# Patient Record
Sex: MALE | Race: WHITE
[De-identification: names, ages, dates, MRNs, and addresses within clinical notes are randomized per-mention and may not be internally consistent; named-entity substitution may affect disease eponyms.]

---

## 2021-04-13 DIAGNOSIS — Z23 ENCOUNTER FOR IMMUNIZATION: ICD-10-CM

## 2024-08-19 ENCOUNTER — HOSPITAL ENCOUNTER (INPATIENT)
Dept: HOSPITAL 99 - 4 WEST ACU | Age: 59
LOS: 8 days | Discharge: HOME HEALTH | DRG: 501 | End: 2024-08-27
Payer: COMMERCIAL

## 2024-08-19 VITALS — RESPIRATION RATE: 18 BRPM | DIASTOLIC BLOOD PRESSURE: 73 MMHG | SYSTOLIC BLOOD PRESSURE: 155 MMHG

## 2024-08-19 VITALS — SYSTOLIC BLOOD PRESSURE: 139 MMHG | DIASTOLIC BLOOD PRESSURE: 96 MMHG | RESPIRATION RATE: 18 BRPM

## 2024-08-19 VITALS — DIASTOLIC BLOOD PRESSURE: 71 MMHG | SYSTOLIC BLOOD PRESSURE: 113 MMHG | RESPIRATION RATE: 18 BRPM

## 2024-08-19 VITALS — BODY MASS INDEX: 41.7 KG/M2

## 2024-08-19 VITALS — SYSTOLIC BLOOD PRESSURE: 166 MMHG | RESPIRATION RATE: 18 BRPM | DIASTOLIC BLOOD PRESSURE: 94 MMHG

## 2024-08-19 DIAGNOSIS — I10: ICD-10-CM

## 2024-08-19 DIAGNOSIS — M86.9: ICD-10-CM

## 2024-08-19 DIAGNOSIS — L97.419: ICD-10-CM

## 2024-08-19 DIAGNOSIS — Z79.84: ICD-10-CM

## 2024-08-19 DIAGNOSIS — D62: ICD-10-CM

## 2024-08-19 DIAGNOSIS — T84.84XA: ICD-10-CM
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
## 2024-08-19 DIAGNOSIS — E11.69: ICD-10-CM

## 2024-08-19 DIAGNOSIS — Z86.61: ICD-10-CM

## 2024-08-19 DIAGNOSIS — E66.9: ICD-10-CM

## 2024-08-19 DIAGNOSIS — E78.00: ICD-10-CM

## 2024-08-19 DIAGNOSIS — Z79.899: ICD-10-CM

## 2024-08-19 DIAGNOSIS — B95.61: ICD-10-CM

## 2024-08-19 DIAGNOSIS — T84.7XXA: Primary | ICD-10-CM

## 2024-08-19 DIAGNOSIS — E11.621: ICD-10-CM

## 2024-08-19 LAB
ALBUMIN SERPL-MCNC: 4.8 G/DL (ref 3.5–5)
ALP SERPL-CCNC: 59 U/L (ref 38–126)
ALT SERPL-CCNC: 58 U/L (ref 0–50)
AST SERPL-CCNC: 46 U/L (ref 17–59)
BUN SERPL-MCNC: 16 MG/DL (ref 9–20)
CALCIUM SERPL-MCNC: 9.8 MG/DL (ref 8.4–10.2)
CHLORIDE SERPL-SCNC: 100 MMOL/L (ref 98–107)
CO2 SERPL-SCNC: 23 MMOL/L (ref 22–30)
EGFR: > 60
ERYTHROCYTE [DISTWIDTH] IN BLOOD BY AUTOMATED COUNT: 13.2 % (ref 11.5–14.5)
GLUCOSE - POINT OF CARE: 201 MG/DL (ref 70–99)
GLUCOSE SERPL-MCNC: 140 MG/DL (ref 70–99)
HCT VFR BLD AUTO: 38.5 % (ref 39–52)
HGB BLD-MCNC: 14 G/DL (ref 13–18)
MCHC RBC AUTO-ENTMCNC: 36.4 G/DL (ref 33–37)
MCV RBC AUTO: 81.2 FL (ref 80–94)
NRBC BLD AUTO-RTO: 0 %
PLATELET # BLD AUTO: 192 10^3/UL (ref 130–400)
POTASSIUM SERPL-SCNC: 3.6 MMOL/L (ref 3.5–5.1)
PROT SERPL-MCNC: 7.2 G/DL (ref 6.3–8.2)
SODIUM SERPL-SCNC: 136 MMOL/L (ref 135–145)

## 2024-08-19 PROCEDURE — 0QCL0ZZ EXTIRPATION OF MATTER FROM RIGHT TARSAL, OPEN APPROACH: ICD-10-PCS | Performed by: PODIATRIST

## 2024-08-19 PROCEDURE — 88304 TISSUE EXAM BY PATHOLOGIST: CPT

## 2024-08-19 PROCEDURE — 0YHM0YZ INSERTION OF OTHER DEVICE INTO RIGHT FOOT, OPEN APPROACH: ICD-10-PCS | Performed by: PODIATRIST

## 2024-08-19 PROCEDURE — C1713 ANCHOR/SCREW BN/BN,TIS/BN: HCPCS

## 2024-08-19 PROCEDURE — 88311 DECALCIFY TISSUE: CPT

## 2024-08-19 PROCEDURE — A9575 INJ GADOTERATE MEGLUMI 0.1ML: HCPCS

## 2024-08-19 PROCEDURE — 0QBL0ZZ EXCISION OF RIGHT TARSAL, OPEN APPROACH: ICD-10-PCS | Performed by: PODIATRIST

## 2024-08-19 RX ADMIN — VANCOMYCIN HYDROCHLORIDE 200: 1 INJECTION, SOLUTION INTRAVENOUS at 22:04

## 2024-08-19 RX ADMIN — TAZOBACTAM SODIUM AND PIPERACILLIN SODIUM 50: 375; 3 INJECTION, SOLUTION INTRAVENOUS at 16:42

## 2024-08-19 RX ADMIN — TRAMADOL HYDROCHLORIDE 50 MG: 50 TABLET, COATED ORAL at 21:59

## 2024-08-19 RX ADMIN — GABAPENTIN 300 MG: 300 CAPSULE ORAL at 22:00

## 2024-08-19 RX ADMIN — VANCOMYCIN HYDROCHLORIDE 200: 1 INJECTION, SOLUTION INTRAVENOUS at 17:08

## 2024-08-19 RX ADMIN — TAZOBACTAM SODIUM AND PIPERACILLIN SODIUM 50: 375; 3 INJECTION, SOLUTION INTRAVENOUS at 23:19

## 2024-08-19 NOTE — HPS.HSE
"Addendum entered and electronically signed by John Fiore MD  08/19/24 18:39: "~"Seen and examined by me independently in collaboration with SHABNAM Jacobs."~"Past medical history/social history/medication/allergies reviewed."~"Lab data and imaging data reviewed."~"58-year-old gentleman with nonhealing right heel wound.  Ever since his surgery in March he has been having recurrent infection.  Gets better with antibiotics but then reoccurs.  He has some sort of anchors placed in the calcaneum bone after the "~"surgery in March.  Seen by podiatrist Dr. Adler and now getting admitted for surgical management of his chronic nonhealing wound."~"He denies any fever or chills."~"Denies any neuropathy or peripheral arterial disease.  He is known diabetic and thinks very well-controlled."~"Hemodynamically stable.  Afebrile."~"Heart sinus and persisted regular."~"Chest clear "~"No lower extremity edema.  Palpable dorsalis pedis and bilaterally."~"Right heel wound in dressing.Rt foot swelling noted."~"Lab data noted-no leukocytosis and creatinine normal.  Foot x-ray noted-Osteomyelitis is not excluded on the basis of this imaging."~"Chronic nonhealing right heel ulcer with overlying soft tissue swelling concerning for soft tissue infection.  Rule out osteomyelitis.  Admit to hospital for further evaluation and treatment.  Start on empirical antibiotics with Vanco and Zosyn.  "~"Wound cultures have been sent from ER.  Obtain MRI of the foot.  Consult podiatrist.  N.p.o. past midnight for possible surgery tomorrow."~"DM 2 -hold metformin.  On Toujeo as an outpatient.  Start on sliding scale insulin.  No neuropathy.  Check an arterial ultrasound of the leg to make sure no PAD."~"Hypertension-continue with ACE inhibitor's but hold hydrochlorothiazide for now."~"Full code"~"Original Note:"~"Family Physician"~"-"~"-: "~"Family Physician:  Uche Brandon"~"Chief Complaint"~"-"~"-: "~"Recurrent Right Heel Infection"~"History of Present Illness"~"-: "~"Pt is a 59 yo M with PMH DM, HTN, and HLD presents following referral from Dr Adler (podiatry) for admission. Pt had R foot surgery in March 2024 which has developed recurrent infection. He reports completing several courses of antibiotics but "~"notes after completion of course the infection recurs. He was sent to the ED by Dr. Adler due to recurrent infection and need for R incision/drainage/washout and hardware removal. He denies fever, chills, SOB, chest pain, and palpitations.  "~"Medical History"~"Past Medical History"~"Past Medical History: Reports Other"~"Additional Past Medical History: "~"Diabetes Mellitus, Type II"~"Essential Hypertension"~"Hyperlipidemia"~"Lumbar Epidural Abscess"~"Past Surgical History: Reports Other"~"Additional Past Surgical History: "~"Lumbar Epidural Abscess Removal "~"Right Heel Surgery"~"Social History"~"Tobacco: Non-smoker"~"Alcohol: Occasional"~"Family History"~"Family History: Not pertinent"~"Allergies / Home Medications"~"-: "~"Allergies reflects when Allergies were last updated in [employer]."~"Home Medications with original date entered in [employer] "~"Allergy/Medication List: "~"Allergies"~"Allergy/AdvReac	Type	Severity	Reaction	Status	Date / Time"~"No Known Allergies	Allergy			Unverified	08/19/24 14:24"~"Home Medications"~"atorvastatin 10 mg tablet 10 mg PO DAILY 05/04/13 "~"gabapentin 300 mg capsule 300 mg PO TIDPRN PRN foot pain 08/19/24 "~"lisinopril 20 mg-hydrochlorothiazide 25 mg tablet 1 tab PO DAILY 08/19/24 "~"metformin 500 mg tablet 1,000 mg PO HS 08/19/24 "~"metformin 500 mg tablet 500 mg PO DAILY 08/19/24 "~"tadalafil 20 mg tablet 20 mg PO DAILYPRN PRN ed 08/19/24 "~"tirzepatide 10 mg/0.5 mL subcutaneous pen injector (Mounjaro) 10 mg SC TU 08/19/24 "~"tramadol 50 mg tablet 50 mg PO Q6HPRN PRN moderate pain 08/19/24 "~"Review of Systems"~"-"~"A 12 point ROS was completed and negative except as noted: Yes"~"Constitutional: Denies Fever or Chills"~"Respiratory: Denies Cough or Trouble Breathing"~"Cardiac: Denies Chest Pain or Palpitations"~"Abdomen/GI: Denies Abdominal Pain, Nausea, Vomiting or Diarrhea"~"Physical Exam"~"Vital Signs"~"-: "~"Vital Signs"~"Temp	Pulse	Resp	BP	Pulse Ox"~" 99.3 F 	 89 	 18 	 166/94 	 96 "~" 08/19/24 14:23	 08/19/24 14:23	 08/19/24 14:23	 08/19/24 14:23	 08/19/24 14:23"~"Physical Exam"~"General: Comfortable and Conversant"~"HEENT: Anicteric and Moist mucous membranes"~"Respiratory: Clear and Non Labored Respirations"~"Cardiac: S1/S2, Regular Rhythm and Murmur (2/6 Systolic Murmur)"~"GI: Soft and Non Tender"~"Rectal: Deferred by Provider"~"Musculoskeletal: No Clubbing, No Cyanosis and No Edema"~"Skin: Warm, Dry and Other (Small right posterior heel wound drainage purulent material)"~"Neuro: Awake, Alert, Oriented and Nonfocal/grossly intact"~"Psych: Calm"~"Laboratory Results"~"-"~"-: "~"08/19/24 15:40 "~"08/19/24 15:40 "~"Laboratory Results"~"Total Bilirubin	 1.1 mg/dl (0.2-1.3)  	08/19/24  15:40    "~"AST	 46 U/L (17-59)  	08/19/24  15:40    "~"ALT	 58 U/L (0-50)  H 	08/19/24  15:40    "~"Alkaline Phosphatase	 59 U/L ()  	08/19/24  15:40    "~"Data Reviewed"~"-"~"Lab Data: Labs Reviewed by me"~"Impression/Plan"~"-"~"-: "~"Infected Right Heel Wound, concern for underlying osteomyelitis and possibly infected hardware"~"-Consult Podiatry"~"-Check Right Foot MRI"~"-Continue empiric Vancomycin and Zosyn"~"-NPO after midnight for OR tomorrow"~"Diabetes Mellitus, Type II"~"-Patient maintained on Mounjaro as outpatient"~"-Hold metformin"~"-Monitor sugars and continue coverage insulin"~"Essential Hypertension"~"-Continue lisinopril"~"-Hold HCTZ"~"Hyperlipidemia"~"-Continue atorvastatin"~"DVT proph: Lovenox"~"Code Status: Full Code"

## 2024-08-19 NOTE — ED.PDOC.TRB
"ED Provider Triage"~"-"~"-: "~"Patient is a 58-year-old male who is status post right foot surgery in March by Dr. Adler with persistent infection.  Patient reports he saw Dr. Adler today and was sent in for admission.  Plan as per patient is that Dr. Adler will do surgery. "~" Patient denies any fever or chills.  PT states that Dr Adler did wound cx today. PT with obvious swelling erythema and open wound that is draining purulent drainage to posterior ankle.  He is nontoxic-appearing will check labs.  Will check labs"

## 2024-08-19 NOTE — PHA.VAN.IN
"Assessment"~"- Assessment"~"Renal Function: Other (5/18/13 baseline scr: 0.9)"~"Concomitant Antimicrobials: ZOSYN"~"- Previous Dosing Experience"~"Previous Regimen: 1GM IV Q8H"~"Date of Regimen: 5/5/13"~"Provided Trough of: 9"~"Provided AUC of: UNKNOWN"~"Patient's SCR is: Decreased compared to previous dosing experience (5/5/13 SCR = 1.0)"~"Patient's weight is: Decreased compared to previous dosing experience (5/5/13 WT = 129.4 KG)"~"AUC Dosing Plan"~"- Dosing Variables"~"Dosing Weight (kg): 120.8"~"Dosing CrCl (ml/min): 100"~"Vd coefficient (L/kg): 0.5"~"- Empiric Dosing"~"Initial / Loading Dose: 2GM TOTAL "~"Maintenance Regimen: 1250MG IV Q12H"~"Estimated AUC (mcg*h/mL): 505"~"Estimated Peak (mcg*h/mL): 31.9"~"Estimated Trough (mcg/ml): 12.7"~"Estimated Half Life (H): 7.9"~"Pharmacokinetics Vancomycin I"~"- -"~"Patient Age: 58"~"Patient Sex: Male"~"Vancomycin Day #: 1"~"Indication: Skin And Soft Tissue ([R] FOOT INFECTION, poss OM)"~"Requesting Provider: AIMEE"~"Height / Weight: "~"Height                        	5 ft 7 in                                       	"~"Actual Weight                 	120.769 kg                                      	"~"- Vital Signs / Lab Results"~"-: "~"Temp	Pulse	Resp	BP	Pulse Ox"~" 99.3 F 	 88 	 18 	 139/96 	 95 "~" 08/19/24 20:00	 08/19/24 20:00	 08/19/24 20:00	 08/19/24 20:00	 08/19/24 20:00"~"Lab Results - Hematology"~" 	08/19/24"~" 	15:40"~"WBC	 7.7"~"Lab Results - Chemistry"~" 	08/19/24"~" 	15:40"~"BUN	 16"~"Creatinine	 0.7"~"Albumin	 4.8"~"Microbiology Results"~" 08/19/24 16:41	Gram Stain - Preliminary"~" Foot - Right	"

## 2024-08-19 NOTE — PTCARENOTE
Pt arrived to room 437-01. Pt AAOx3, VSS. Pt c/o 6/10 pain in right foot. Pt oriented to room, call bell placed within reach.

## 2024-08-20 VITALS — DIASTOLIC BLOOD PRESSURE: 72 MMHG | SYSTOLIC BLOOD PRESSURE: 144 MMHG | RESPIRATION RATE: 23 BRPM

## 2024-08-20 VITALS — DIASTOLIC BLOOD PRESSURE: 74 MMHG | SYSTOLIC BLOOD PRESSURE: 141 MMHG | RESPIRATION RATE: 17 BRPM

## 2024-08-20 VITALS — OXYGEN SATURATION: 2 % | SYSTOLIC BLOOD PRESSURE: 110 MMHG | DIASTOLIC BLOOD PRESSURE: 69 MMHG

## 2024-08-20 VITALS — RESPIRATION RATE: 20 BRPM | DIASTOLIC BLOOD PRESSURE: 92 MMHG | SYSTOLIC BLOOD PRESSURE: 153 MMHG

## 2024-08-20 VITALS — DIASTOLIC BLOOD PRESSURE: 71 MMHG | SYSTOLIC BLOOD PRESSURE: 150 MMHG | RESPIRATION RATE: 20 BRPM

## 2024-08-20 VITALS — RESPIRATION RATE: 16 BRPM | SYSTOLIC BLOOD PRESSURE: 146 MMHG | DIASTOLIC BLOOD PRESSURE: 83 MMHG

## 2024-08-20 VITALS — SYSTOLIC BLOOD PRESSURE: 110 MMHG | RESPIRATION RATE: 16 BRPM | DIASTOLIC BLOOD PRESSURE: 69 MMHG

## 2024-08-20 VITALS — SYSTOLIC BLOOD PRESSURE: 138 MMHG | RESPIRATION RATE: 18 BRPM | DIASTOLIC BLOOD PRESSURE: 83 MMHG

## 2024-08-20 VITALS — SYSTOLIC BLOOD PRESSURE: 134 MMHG | RESPIRATION RATE: 16 BRPM | DIASTOLIC BLOOD PRESSURE: 68 MMHG

## 2024-08-20 VITALS — OXYGEN SATURATION: 1 % | RESPIRATION RATE: 17 BRPM | DIASTOLIC BLOOD PRESSURE: 70 MMHG

## 2024-08-20 VITALS — DIASTOLIC BLOOD PRESSURE: 64 MMHG | SYSTOLIC BLOOD PRESSURE: 146 MMHG

## 2024-08-20 VITALS — DIASTOLIC BLOOD PRESSURE: 85 MMHG | SYSTOLIC BLOOD PRESSURE: 131 MMHG

## 2024-08-20 VITALS — SYSTOLIC BLOOD PRESSURE: 131 MMHG | DIASTOLIC BLOOD PRESSURE: 85 MMHG

## 2024-08-20 LAB
BUN SERPL-MCNC: 16 MG/DL (ref 9–20)
CALCIUM SERPL-MCNC: 9.4 MG/DL (ref 8.4–10.2)
CHLORIDE SERPL-SCNC: 101 MMOL/L (ref 98–107)
CO2 SERPL-SCNC: 25 MMOL/L (ref 22–30)
EGFR: > 60
ERYTHROCYTE [DISTWIDTH] IN BLOOD BY AUTOMATED COUNT: 13.2 % (ref 11.5–14.5)
ESTIMATED CREATININE CLEARANCE: 111 ML/MIN
GLUCOSE - POINT OF CARE: 160 MG/DL (ref 70–99)
GLUCOSE - POINT OF CARE: 161 MG/DL (ref 70–99)
GLUCOSE - POINT OF CARE: 191 MG/DL (ref 70–99)
GLUCOSE - POINT OF CARE: 269 MG/DL (ref 70–99)
GLUCOSE SERPL-MCNC: 152 MG/DL (ref 70–99)
HBA1C MFR BLD: 7.1 % (ref 4–5.6)
HCT VFR BLD AUTO: 37 % (ref 39–52)
HGB BLD-MCNC: 13.1 G/DL (ref 13–18)
MCHC RBC AUTO-ENTMCNC: 35.4 G/DL (ref 33–37)
MCV RBC AUTO: 83.7 FL (ref 80–94)
PLATELET # BLD AUTO: 180 10^3/UL (ref 130–400)
POTASSIUM SERPL-SCNC: 4.1 MMOL/L (ref 3.5–5.1)
SODIUM SERPL-SCNC: 136 MMOL/L (ref 135–145)

## 2024-08-20 RX ADMIN — HYDROMORPHONE HYDROCHLORIDE 0.5 MG: 1 INJECTION, SOLUTION INTRAMUSCULAR; INTRAVENOUS; SUBCUTANEOUS at 18:12

## 2024-08-20 RX ADMIN — INSULIN ASPART 1 UNITS: 100 INJECTION, SOLUTION INTRAVENOUS; SUBCUTANEOUS at 14:08

## 2024-08-20 RX ADMIN — SODIUM CHLORIDE 300 ML: 900 INJECTION, SOLUTION INTRAVENOUS at 18:20

## 2024-08-20 RX ADMIN — TAZOBACTAM SODIUM AND PIPERACILLIN SODIUM 50: 375; 3 INJECTION, SOLUTION INTRAVENOUS at 16:15

## 2024-08-20 RX ADMIN — TAZOBACTAM SODIUM AND PIPERACILLIN SODIUM 50: 375; 3 INJECTION, SOLUTION INTRAVENOUS at 09:15

## 2024-08-20 RX ADMIN — LISINOPRIL 20 MG: 20 TABLET ORAL at 09:14

## 2024-08-20 RX ADMIN — INSULIN ASPART 1 UNITS: 100 INJECTION, SOLUTION INTRAVENOUS; SUBCUTANEOUS at 18:15

## 2024-08-20 RX ADMIN — TAZOBACTAM SODIUM AND PIPERACILLIN SODIUM 50: 375; 3 INJECTION, SOLUTION INTRAVENOUS at 21:08

## 2024-08-20 RX ADMIN — ATORVASTATIN CALCIUM 10 MG: 10 TABLET, FILM COATED ORAL at 08:10

## 2024-08-20 RX ADMIN — VANCOMYCIN HYDROCHLORIDE 275 MG: 1 INJECTION, POWDER, LYOPHILIZED, FOR SOLUTION INTRAVENOUS at 06:16

## 2024-08-20 RX ADMIN — INSULIN ASPART 1 UNITS: 100 INJECTION, SOLUTION INTRAVENOUS; SUBCUTANEOUS at 09:14

## 2024-08-20 RX ADMIN — TAZOBACTAM SODIUM AND PIPERACILLIN SODIUM 50: 375; 3 INJECTION, SOLUTION INTRAVENOUS at 03:19

## 2024-08-20 RX ADMIN — TRAMADOL HYDROCHLORIDE 50 MG: 50 TABLET, COATED ORAL at 22:02

## 2024-08-20 RX ADMIN — VANCOMYCIN HYDROCHLORIDE 300 MG: 1 INJECTION, POWDER, LYOPHILIZED, FOR SOLUTION INTRAVENOUS at 18:20

## 2024-08-20 RX ADMIN — INSULIN ASPART: 100 INJECTION, SOLUTION INTRAVENOUS; SUBCUTANEOUS at 19:10

## 2024-08-20 RX ADMIN — GABAPENTIN 300 MG: 300 CAPSULE ORAL at 22:02

## 2024-08-20 RX ADMIN — TRAMADOL HYDROCHLORIDE 50 MG: 50 TABLET, COATED ORAL at 09:40

## 2024-08-20 NOTE — W.PN.UPDATE
"Update Note"~"Progress Note Update"~"-: "~"Patient seen and evaluated by Orthopedic surgery this morning.  Plan for OR later today under the direction of Dr. Adler for right ankle I&D and removal of hardware.  Patient to remain NPO.  Order placed. Currently on started on empirical "~"antibiotics with Vanco and Zosyn. Please hold Lovenox at this time. Resume per Dr. Adler recommendation. Consent obtained and placed in patient's chart.  All questions were answered."

## 2024-08-20 NOTE — W.PN.HOSP.TC
"Today's Communication/Plan"~"-"~"-: "~"OR today"~"MRI foot pending"~"Assessment / Plan"~"Assessment / Plan"~"-: "~"Chronic nonhealing right heel ulcer with overlying soft tissue swelling concerning for soft tissue infection.  Rule out osteomyelitis.  Admitted to hospital for further evaluation and treatment.  Started on empirical antibiotics with Vanco and "~"Zosyn.  Wound cultures have been sent from ER.  Obtain MRI of the foot.  Consult podiatrist.  N.p.o.  for possible surgery today."~"DM 2 -hold metformin.  On Toujeo as an outpatient.  Started on sliding scale insulin.  No neuropathy.  Check an arterial ultrasound of the leg to make sure no PAD."~"Hypertension-continue with ACE inhibitor's but hold hydrochlorothiazide for now."~"Anticipated Discharge: 24 - 48 hours"~"Subjective/Interval History"~"-"~"-: "~"Date of Service:  August 20, 2024"~"no fever "~"Objective Data"~"-"~"Labs: "~"Laboratory Results"~" 	08/20/24"~" 	07:09"~"WBC	 5.6"~"Hgb	 13.1"~"Hct	 37.0 L"~"Plt Count	 180"~"Sodium	 136"~"Potassium	 4.1"~"Chloride	 101"~"Carbon Dioxide	 25"~"BUN	 16"~"Creatinine	 0.9"~"Glucose	 152 H"~"Calcium	 9.4"~"Vital Signs: "~"Vital Signs"~"Temp	Pulse	Resp	BP	Pulse Ox"~" 98.4 F 	 77 	 16 	 110/69 	 98 "~" 08/20/24 07:34	 08/20/24 07:34	 08/20/24 07:34	 08/20/24 07:34	 08/20/24 11:44"~"I&O"~"	08/19/24	08/20/24	08/21/24"~"	06:59	06:59	06:59"~"Intake Total		480 / 480	"~"Balance		480 / 480	"~"Review of Systems"~"-"~"Respiratory: Denies Trouble Breathing"~"Cardiac: Denies Chest Pain"~"Abdomen/GI: Denies Abdominal Pain, Nausea or Vomiting"~"Neuro: Denies Dizzy"~"Physical Exam"~"-"~"General: Comfortable"~"Respiratory: Non Labored Respirations; Negative Accessory Resp Muscle Use"~"Cardiac: Regular Rhythm and S1/S2"~"GI: Soft"~"Neuro: AO x 3"~"Data Reviewed"~"-"~"Labs: Labs Reviewed by me"

## 2024-08-20 NOTE — PHA.VAN.FU
"Vancomycin Assessment / Plan"~"- Assessment"~"Renal Function: Stable"~"WBC's are: WNL"~"In the past 24 hrs, patient has been: Afebrile"~"Concomitant Antimicrobials: piperacillin/tazobactam"~"- Dosing Plan"~"Adjust Regimen to: Vanc 1500mg Q12H starting at 1800"~"New Regimen Predicts: AUC (459), Peak (30.1), Trough (10.9)"~"- Monitoring Plan"~"No level(s) ordered at this time: consider levels in next few days"~"- Follow Up"~"-: "~"Pharmacy will continue to follow.  "~"Vancomycin Follow UP"~"- -"~"Patient Age: 58"~"Patient Sex: Male"~"Vancomycin Day #: 2"~"Indication: Skin And Soft Tissue"~"Requesting Provider: ROSA Porter"~"Pertinent Antimicrobial Allergies: "~"NKDA"~"Height / Weight: "~"Height                        	5 ft 7 in                                       	"~"Actual Weight                 	120.769 kg                                      	"~"Pertinent Past Medical History: BMI ~42, DM2"~"- Vital Signs / Lab Results"~"-: "~"Temp	Pulse	Resp	BP	Pulse Ox"~" 98.4 F 	 77 	 16 	 110/69 	 94 "~" 08/20/24 07:34	 08/20/24 07:34	 08/20/24 07:34	 08/20/24 07:34	 08/20/24 07:34"~"Lab Results - Hematology"~" 	08/19/24	08/20/24"~" 	15:40	07:09"~"WBC	 7.7	 5.6"~"Lab Results - Chemistry"~" 	08/19/24	08/20/24"~" 	15:40	07:09"~"BUN	 16	 16"~"Creatinine	 0.7	 0.9"~"Estimated Creat Clear		 111"~"Albumin	 4.8	"~"Microbiology Results"~" 08/19/24 16:41	Gram Stain - Preliminary"~" Foot - Right	"

## 2024-08-20 NOTE — CM
"Patient seen at bedside. Patient states that he lives with family. Patient stated she lives in a 3 story home and Patient has a walker and crutches at home. Patient stated that his PCP is Dr. Brandon and he  uses the [facility] in [place]. Patient "~"states that he is going for surgery later today. CM will continue to follow for discharge planning needs."~"Plan; home with no needs vs home with VN pending medical treatment plan"

## 2024-08-20 NOTE — W.PN.SURGUPD
"Surgical Update"~"Surgical Update"~"-: "~"S/p Right heel debridement and hardware removal"~"-Patient to remain NWB to RLE with strict elevation"~"-Dressings to remain intact, reinforce as needed"~"-Continue abx per ID recs"~"-Plan for return to OR on Thursday 8/22 for further debridement and possible closure"

## 2024-08-20 NOTE — PTCARENOTE
Patient arrived back to unit via stretcher from PACU around 19:10. AAOX3. Denies pain or discomfort.

## 2024-08-21 VITALS — DIASTOLIC BLOOD PRESSURE: 72 MMHG | SYSTOLIC BLOOD PRESSURE: 113 MMHG | RESPIRATION RATE: 20 BRPM

## 2024-08-21 VITALS — DIASTOLIC BLOOD PRESSURE: 76 MMHG | SYSTOLIC BLOOD PRESSURE: 141 MMHG | RESPIRATION RATE: 18 BRPM

## 2024-08-21 VITALS — RESPIRATION RATE: 16 BRPM | SYSTOLIC BLOOD PRESSURE: 143 MMHG | DIASTOLIC BLOOD PRESSURE: 73 MMHG

## 2024-08-21 VITALS — HEART RATE: 85 BPM | DIASTOLIC BLOOD PRESSURE: 76 MMHG | SYSTOLIC BLOOD PRESSURE: 140 MMHG | OXYGEN SATURATION: 97 %

## 2024-08-21 VITALS — DIASTOLIC BLOOD PRESSURE: 78 MMHG | SYSTOLIC BLOOD PRESSURE: 129 MMHG | RESPIRATION RATE: 16 BRPM

## 2024-08-21 VITALS — HEART RATE: 84 BPM | OXYGEN SATURATION: 93 %

## 2024-08-21 LAB
GLUCOSE - POINT OF CARE: 139 MG/DL (ref 70–99)
GLUCOSE - POINT OF CARE: 158 MG/DL (ref 70–99)
GLUCOSE - POINT OF CARE: 163 MG/DL (ref 70–99)
GLUCOSE - POINT OF CARE: 259 MG/DL (ref 70–99)

## 2024-08-21 RX ADMIN — INSULIN ASPART 1 UNITS: 100 INJECTION, SOLUTION INTRAVENOUS; SUBCUTANEOUS at 17:31

## 2024-08-21 RX ADMIN — ATORVASTATIN CALCIUM 10 MG: 10 TABLET, FILM COATED ORAL at 09:34

## 2024-08-21 RX ADMIN — VANCOMYCIN HYDROCHLORIDE 300 MG: 1 INJECTION, POWDER, LYOPHILIZED, FOR SOLUTION INTRAVENOUS at 05:25

## 2024-08-21 RX ADMIN — GABAPENTIN 300 MG: 300 CAPSULE ORAL at 11:19

## 2024-08-21 RX ADMIN — INSULIN ASPART: 100 INJECTION, SOLUTION INTRAVENOUS; SUBCUTANEOUS at 09:43

## 2024-08-21 RX ADMIN — TAZOBACTAM SODIUM AND PIPERACILLIN SODIUM 50: 375; 3 INJECTION, SOLUTION INTRAVENOUS at 04:17

## 2024-08-21 RX ADMIN — CEFAZOLIN 10: 10 INJECTION, POWDER, FOR SOLUTION INTRAVENOUS at 23:51

## 2024-08-21 RX ADMIN — INSULIN ASPART: 100 INJECTION, SOLUTION INTRAVENOUS; SUBCUTANEOUS at 12:34

## 2024-08-21 RX ADMIN — TRAMADOL HYDROCHLORIDE 50 MG: 50 TABLET, COATED ORAL at 18:02

## 2024-08-21 RX ADMIN — TRAMADOL HYDROCHLORIDE 50 MG: 50 TABLET, COATED ORAL at 23:50

## 2024-08-21 RX ADMIN — ACETAMINOPHEN 650 MG: 325 TABLET ORAL at 09:34

## 2024-08-21 RX ADMIN — ACETAMINOPHEN 650 MG: 325 TABLET ORAL at 18:01

## 2024-08-21 RX ADMIN — SODIUM CHLORIDE 300 ML: 900 INJECTION, SOLUTION INTRAVENOUS at 05:25

## 2024-08-21 RX ADMIN — TAZOBACTAM SODIUM AND PIPERACILLIN SODIUM 50: 375; 3 INJECTION, SOLUTION INTRAVENOUS at 11:20

## 2024-08-21 RX ADMIN — TAZOBACTAM SODIUM AND PIPERACILLIN SODIUM 50: 375; 3 INJECTION, SOLUTION INTRAVENOUS at 15:07

## 2024-08-21 RX ADMIN — CEFAZOLIN 10: 10 INJECTION, POWDER, FOR SOLUTION INTRAVENOUS at 17:32

## 2024-08-21 RX ADMIN — TRAMADOL HYDROCHLORIDE 50 MG: 50 TABLET, COATED ORAL at 05:24

## 2024-08-21 RX ADMIN — GABAPENTIN 300 MG: 300 CAPSULE ORAL at 05:24

## 2024-08-21 RX ADMIN — LISINOPRIL 20 MG: 20 TABLET ORAL at 09:34

## 2024-08-21 RX ADMIN — GABAPENTIN 300 MG: 300 CAPSULE ORAL at 23:50

## 2024-08-21 RX ADMIN — TRAMADOL HYDROCHLORIDE 50 MG: 50 TABLET, COATED ORAL at 11:20

## 2024-08-21 NOTE — PTOTSP
"pt currently requires supervision to no assistance to complete simple ADLs, functional transfers, ambulation. educated pt on walker safety, dressing technique. pt demonstrated and verbalized understanding. no acute OT needs identified at this time, "~"will sign off. "

## 2024-08-21 NOTE — PHA.VAN.FU
"Addendum entered and electronically signed by Shirley Fernandez [facility]  08/21/24 11:36: "~"Agree with resident's assessment and plan below."~"Original Note:"~"Vancomycin Assessment / Plan"~"- Assessment"~"Renal Function: Stable"~"WBC's are: WNL"~"In the past 24 hrs, patient has been: Afebrile"~"- Dosing Plan"~"Continue: 1500mg Q12H"~"- Monitoring Plan"~"No level(s) ordered at this time: consider level in the next few days"~"- Follow Up"~"-: "~"Pharmacy will continue to follow.  "~"Vancomycin Follow UP"~"- -"~"Patient Age: 58"~"Patient Sex: Male"~"Vancomycin Day #: 3"~"Indication: Skin And Soft Tissue"~"Requesting Provider: ROSA Porter"~"Pertinent Antimicrobial Allergies: "~"NKDA"~"Height / Weight: "~"Height                        	5 ft 7 in                                       	"~"Actual Weight                 	120.769 kg                                      	"~"Pertinent Past Medical History: BMI ~42, T2DM"~"- Vital Signs / Lab Results"~"-: "~"Temp	Pulse	Resp	BP	Pulse Ox"~" 98.9 F 	 90 	 16 	 129/78 	 92 "~" 08/21/24 07:30	 08/21/24 07:30	 08/21/24 07:30	 08/21/24 07:30	 08/21/24 07:30"~"Lab Results - Hematology"~" 	08/19/24	08/20/24"~" 	15:40	07:09"~"WBC	 7.7	 5.6"~"Lab Results - Chemistry"~" 	08/19/24	08/20/24"~" 	15:40	07:09"~"BUN	 16	 16"~"Creatinine	 0.7	 0.9"~"Estimated Creat Clear		 111"~"Albumin	 4.8	"~"Microbiology Results"~" 08/19/24 16:41	Wound Culture - Final"~" Foot - Right	   S aureus-Methicillin Sensitive"~"	Gram Stain - Final"~" 08/19/24 22:06	MRSA Screen - Final"~" Nose	   No Methicillin Resistant Staphylococcus aureus isolated."~" 08/20/24 17:50	Gram Stain - Preliminary"~" Heel - Right	"~" 08/20/24 17:50	Gram Stain - Preliminary"~" Heel - Right	"~" 08/19/24 16:41	Blood Culture - Preliminary"~" Blood/Venous	   No Growth in 24 hours- Final report to follow"

## 2024-08-21 NOTE — W.PN.UPDATE
"Update Note"~"Progress Note Update"~"-: "~"Patient is sitting up comfortably in bed. He reports no pain in the foot/ankle."~"Directed exam of right lower extremity reveals dressing clean, dry, and intact. Surrounding skin intact. No discolorations. Wiggles all toes. Sensation intact to light touch. Cap refill &lt;2secs"~"Wound culture from 8/19/24 with Staph aureus"~"Wound cultures obtained in the OR on 8/20/24 pending"~"POD#1 Right heel debridement and hardware removal under the direction of Dr. Adler"~"-Patient to remain NWB to RLE with strict elevation"~"-Dressings to remain intact, reinforce as needed"~"-Continue abx, currently Zosyn and Vancomycin"~"-Plan for return to OR on Thursday 8/22 for further debridement and possible closure. Updated consent obtained and placed on chart"~"-NPO after midnight"

## 2024-08-21 NOTE — PN.CDI
"CDI"~"- -"~"CDI: "~"Physician Documentation Request"~"Admit Date: 08/19/24 18:51"~"Dear Doctor Adebayo,"~"Patient admitted with chronic nonhealing right heel ulcer."~"Please review the following and provide your response in the progress notes. "~"Clinical Indicators:  "~"	Height:  5' 7'"~"	Weight: 266 lb 4 oz"~"	BMI: 41.7"~"	"~"Please provide an associated diagnosis related to the abnormal BMI, such as:"~"	Morbid obesity"~"	Obesity"~"	BMI is not significant"~"	Other"~"BMI &gt; or = to 40"~"Overweight"~"Obesity:"~"    Due to excess calories"~"    Drug induced"~"    Due to other cause"~"Severe or morbid obesity:"~"    With alveolar hypoventilation (Obesity hypoventilation syndrome)"~"    Without alveolar hypoventilation"~"	"~"	"~"Use of terms such as suspected, likely, concern for, or probable (associated with a specific diagnosis that is being evaluated, monitored, or treated as if it exists) are acceptable and can be coded in the inpatient setting, when documented at the "~"time of discharge. "~"Thank you, "~"Jocelynn VIDAL,RN,CCDS"~"CDI Specialist"~"Available via tiger text"~"Please use your independent medical judgment in providing your response."

## 2024-08-21 NOTE — DOWNTIME
"There was a [employer] Client  Downtime on 8/21/2024 from 0100 to 8/21/2024 at 0252. Downtime documentation of patient's care, including medication administrations, has been reconciled in the electronic record per guidelines. Refer to the "~"patient's paper chart under the miscellaneous tab to see printed paper medication records and downtime forms."

## 2024-08-21 NOTE — W.PN.HOSP.TC
"Addendum entered and electronically signed by John Fiore MD  08/21/24 15:57: "~"Based on BMI measurements on adx- suggestive of obesity."~"Original Note:"~"Today's Communication/Plan"~"-"~"-: "~"Continue with antibiotics"~"OR tomorrow per podiatry  "~"Consult ID"~"Assessment / Plan"~"Assessment / Plan"~"-: "~"Chronic nonhealing right heel ulcer with overlying soft tissue swelling concerning for soft tissue infection.  MRI raises concern for calcaneal osteomyelitis."~"Status post I&D of right heel wound as well as removal of anchors in the heel.  For repeat surgery Thursday and possible closure then noted."~"Continue the current antibiotics and follow culture data."~"Patient might need long-term antibiotics-consult ID."~"DM 2 -hold metformin.  On [facility] as an outpatient.  Started on sliding scale insulin.  No neuropathy.  HbA1c 7.1"~"Hypertension-continue with ACE inhibitor's but hold hydrochlorothiazide for now."~"Anticipated Discharge: &gt; 48 hours"~"Subjective/Interval History"~"-"~"-: "~"Date of Service:  August 21, 2024"~"Patient's status post right heel I&D 8/20.  Pain control okay with pain medication."~"No fever or chills."~"Objective Data"~"-"~"Vital Signs: "~"Vital Signs"~"Temp	Pulse	Resp	BP	Pulse Ox"~" 98.9 F 	 90 	 16 	 129/78 	 92 "~" 08/21/24 07:30	 08/21/24 07:30	 08/21/24 07:30	 08/21/24 07:30	 08/21/24 07:30"~"I&O"~"	08/20/24	08/21/24	08/22/24"~"	06:59	06:59	06:59"~"Intake Total	480 / 480	735 / 735	"~"Output Total		1075 / 1075	"~"Balance	480 / 480	-340 / -340	"~"Review of Systems"~"-"~"Constitutional: Denies Fever"~"Respiratory: Denies Trouble Breathing"~"Cardiac: Denies Chest Pain"~"Abdomen/GI: Denies Abdominal Pain, Nausea, Vomiting or Diarrhea"~"Neuro: Denies Dizzy"~"Physical Exam"~"-"~"General: Comfortable"~"Respiratory: Non Labored Respirations; Negative Accessory Resp Muscle Use"~"Cardiac: Regular Rhythm and S1/S2"~"GI: Soft"~"Musculoskeletal: Other (rt foot in dressing)"~"Neuro: AO x 3"~"Psych: Calm"~"Data Reviewed"~"-"~"Labs: Labs Reviewed by me"

## 2024-08-21 NOTE — CON.ID
"Consultation"~"-"~"Date/Time Consultation Requested: 8/21/2024  08:34"~"Date/Time Consultation Performed: 8/21/2024 1540"~"Requesting Provider: Dr. Fiore"~"Performing Provider: Dr. Bojorquez"~"Reason for Consultation: Right heel infection"~"Chief Complaint / Past History"~"History of Present Illness"~"-: "~"Alan Alan is a 58-year-old man being evaluated the request of Dr. Fiore in regards to a right heel infection.  History is obtained from chart review, along with patient interview."~"The patient has a history of bone spurs, and on March 28, 2024 underwent surgery which involved manipulating the Achilles tendon.  He recalls that approximate 2 days after surgery he 'popped a stitch', and approximately 10 days after surgery he "~"recalls developing an infection.  He reports receiving several rounds of doxycycline, and after each course of antibiotics of the drainage decreased, only to return following the cessation of antibiotic therapy.  He has previously seen Infectious "~"Diseases and recently was on a course of Levaquin.  He has continued to follow with Orthopedics, and was sent into the hospital on 8/19 for more definitive washout and surgery."~"At this point in time he is status post removal of hardware (anchors), with a further surgery anticipated tomorrow.  He denies any fevers or chills.  He denies any spreading erythema up his leg.  Thus far he has had no issues with the antibiotics."~"Past History"~"Additional Past Medical History: "~"DM"~"HTN"~"Dyslipidemia"~"Additional Past Surgical History: "~"Right ankle surgery"~"Allergy History: "~"No Known Allergies Allergy (Unverified 08/19/24 14:24)"~"	"~"Medications Reviewed: Yes"~"Current Antibiotics: "~"Zosyn"~"Vancomycin"~"Social History"~"Tobacco: Non-Smoker"~"Alcohol: None"~"Drug: None"~"Personal: "~"Living: With Family"~"Employment: Employed"~"Family History"~"Family History: Not Pertinent"~"Review of Systems"~"Vital Signs"~"-: "~"Temp	Pulse	Resp	BP	Pulse Ox"~" 99.8 F 	 88 	 16 	 143/73 	 96 "~" 08/21/24 15:21	 08/21/24 15:21	 08/21/24 15:21	 08/21/24 15:21	 08/21/24 15:21"~"Physical Exam"~"Physical Exam"~"Constitutional: No Acute Distress, Comfortable and Non-toxic"~"Eyes: No Conjunctival Hemorrhage and Sclera Anicteric"~"Oral: No Thrush and No Ulcers"~"Cardiovascular: S1/S2; Negative S3/S4"~"Pulmonary: Clear and Non Labored"~"Gastrointestinal: Soft, Non Tender, Non Distended and Normal Bowel Sounds"~"Extremities: Edema (Trace right lower extremity); Negative Erythema"~"Skin: Warm and Dry; Negative Rash or Jaundice"~"Wound: Other (Right ankle area dressed in Ace wrap (not removed because of recent surgery))"~"Neurological: Awake and Alert"~"Psychological: Calm"~"Lab / Diagnostic Study Results"~"-: "~"08/20/24 07:09 "~"08/20/24 07:09 "~"Abs Immat Gran (auto)	 0.1 10^3/uL (0-0.05)  H 	08/19/24  15:40    "~"Absolute Neuts (auto)	 4.8 10^3/uL (1.4-6.5)  	08/19/24  15:40    "~"Absolute Lymphs (auto)	 1.6 10^3/uL (1.2-3.4)  	08/19/24  15:40    "~"Absolute Monos (auto)	 0.9 10^3/uL (0.1-0.6)  H 	08/19/24  15:40    "~"Absolute Basos (auto)	 0.0 10^3/uL (0-0.2)  	08/19/24  15:40    "~"Immature Gran %	 0.7 % (0-0.5)  H 	08/19/24  15:40    "~"Neutrophils %	 62.8 % (42.2-75.2)  	08/19/24  15:40    "~"Lymphocytes %	 21.1 % (20.5-51.1)  	08/19/24  15:40    "~"Monocytes %	 12.1 % (1.7-9.3)  H 	08/19/24  15:40    "~"Eosinophils %	 2.9 % (0-6)  	08/19/24  15:40    "~"Basophils %	 0.4 % (0-2)  	08/19/24  15:40    "~"Microbiology Results"~"Micro: "~" 08/20/24 17:50	Anaerobic Culture - Preliminary"~" Heel - Right	   Culture pending.  Anaerobic cultures are examined after 3"~"	   days incubation.  Additional information to follow."~" 08/20/24 17:50	Tissue Culture - Preliminary"~" Heel - Right	   Staphylococcus aureus"~"	Gram Stain - Preliminary"~" 08/20/24 17:50	Wound Culture - Preliminary"~" Heel - Right	   No growth"~"	Gram Stain - Preliminary"~" 08/19/24 16:41	Wound Culture - Final"~" Foot - Right	   S aureus-Methicillin Sensitive"~"	Gram Stain - Final"~" 08/19/24 22:06	MRSA Screen - Final"~" Nose	   No Methicillin Resistant Staphylococcus aureus isolated."~" 08/19/24 16:41	Blood Culture - Preliminary"~" Blood/Venous	   No Growth in 24 hours- Final report to follow"~"-: "~"Imaging:"~"8/20/2024 MRI right lower extremity:  There is a soft tissue defect with underlying phlegmonous change along the posterior soft tissues of the heel extending through the distal aspect of the Achilles tendon and into the underlying posterior "~"calcaneal bone with findings of calcaneal osteomyelitis. There is increased signal more pronounced along the superior calcaneal anchors, likely due to loosening/infection. "~"8/19/2024 Right foot x-ray: The posterior and superior aspects of the os calcis have been resected in the interval since the prior study and there is severe overlying soft tissue swelling."~"Osteomyelitis is not excluded on the basis of this imaging and if this is a clinical consideration, it could be best further evaluated with nonemergent MRI.  Please see full dictation for additional detail.  Film personally viewed."~"Assessment / Plan"~"-: "~"Right ankle osteomyelitis"~"DM (uncontrolled (A1c = 7.1)"~"HTN"~"Dyslipidemia"~"Recommendations:"~"Review of culture data indicate the presence of MSSA.  Imaging indicates the presence of osteomyelitis."~"Discontinue further vancomycin and Zosyn.  Transition to cefazolin 2 g IV every 8 hours.  Given osteomyelitis, patient will require 6-week course of antibiotics."~"Moving forward, patient will require PICC line placement, which can be placed once home infusion benefits can be determined.  "~"Will place home infusion sheet on paper chart."

## 2024-08-22 VITALS — SYSTOLIC BLOOD PRESSURE: 123 MMHG | RESPIRATION RATE: 16 BRPM | DIASTOLIC BLOOD PRESSURE: 74 MMHG

## 2024-08-22 VITALS — SYSTOLIC BLOOD PRESSURE: 127 MMHG | RESPIRATION RATE: 18 BRPM | DIASTOLIC BLOOD PRESSURE: 61 MMHG

## 2024-08-22 VITALS — DIASTOLIC BLOOD PRESSURE: 80 MMHG | RESPIRATION RATE: 16 BRPM | SYSTOLIC BLOOD PRESSURE: 147 MMHG

## 2024-08-22 VITALS
SYSTOLIC BLOOD PRESSURE: 149 MMHG | RESPIRATION RATE: 14 BRPM | OXYGEN SATURATION: 2 % | DIASTOLIC BLOOD PRESSURE: 70 MMHG

## 2024-08-22 VITALS — DIASTOLIC BLOOD PRESSURE: 80 MMHG | SYSTOLIC BLOOD PRESSURE: 125 MMHG | RESPIRATION RATE: 18 BRPM

## 2024-08-22 VITALS — RESPIRATION RATE: 15 BRPM | DIASTOLIC BLOOD PRESSURE: 62 MMHG | SYSTOLIC BLOOD PRESSURE: 120 MMHG

## 2024-08-22 VITALS — DIASTOLIC BLOOD PRESSURE: 81 MMHG | SYSTOLIC BLOOD PRESSURE: 139 MMHG | RESPIRATION RATE: 18 BRPM

## 2024-08-22 VITALS — DIASTOLIC BLOOD PRESSURE: 70 MMHG | SYSTOLIC BLOOD PRESSURE: 131 MMHG | RESPIRATION RATE: 18 BRPM

## 2024-08-22 VITALS — SYSTOLIC BLOOD PRESSURE: 129 MMHG | DIASTOLIC BLOOD PRESSURE: 61 MMHG | RESPIRATION RATE: 14 BRPM

## 2024-08-22 VITALS — OXYGEN SATURATION: 2 % | RESPIRATION RATE: 12 BRPM

## 2024-08-22 VITALS — DIASTOLIC BLOOD PRESSURE: 61 MMHG | RESPIRATION RATE: 12 BRPM | OXYGEN SATURATION: 2 %

## 2024-08-22 VITALS — DIASTOLIC BLOOD PRESSURE: 90 MMHG | RESPIRATION RATE: 14 BRPM | SYSTOLIC BLOOD PRESSURE: 149 MMHG

## 2024-08-22 LAB
GLUCOSE - POINT OF CARE: 143 MG/DL (ref 70–99)
GLUCOSE - POINT OF CARE: 167 MG/DL (ref 70–99)
GLUCOSE - POINT OF CARE: 180 MG/DL (ref 70–99)
GLUCOSE - POINT OF CARE: 273 MG/DL (ref 70–99)

## 2024-08-22 PROCEDURE — 0LQN0ZZ REPAIR RIGHT LOWER LEG TENDON, OPEN APPROACH: ICD-10-PCS | Performed by: PODIATRIST

## 2024-08-22 RX ADMIN — FENTANYL CITRATE 50 MCG: 0.05 INJECTION, SOLUTION INTRAMUSCULAR; INTRAVENOUS at 19:12

## 2024-08-22 RX ADMIN — INSULIN ASPART 1 UNITS: 100 INJECTION, SOLUTION INTRAVENOUS; SUBCUTANEOUS at 19:08

## 2024-08-22 RX ADMIN — GABAPENTIN 300 MG: 300 CAPSULE ORAL at 08:50

## 2024-08-22 RX ADMIN — ACETAMINOPHEN 650 MG: 325 TABLET ORAL at 08:50

## 2024-08-22 RX ADMIN — GABAPENTIN 300 MG: 300 CAPSULE ORAL at 20:06

## 2024-08-22 RX ADMIN — ACETAMINOPHEN 650 MG: 325 TABLET ORAL at 20:05

## 2024-08-22 RX ADMIN — FENTANYL CITRATE 50 MCG: 0.05 INJECTION, SOLUTION INTRAMUSCULAR; INTRAVENOUS at 19:25

## 2024-08-22 RX ADMIN — CEFAZOLIN 10: 10 INJECTION, POWDER, FOR SOLUTION INTRAVENOUS at 08:47

## 2024-08-22 RX ADMIN — ATORVASTATIN CALCIUM 10 MG: 10 TABLET, FILM COATED ORAL at 08:47

## 2024-08-22 RX ADMIN — MEPERIDINE HYDROCHLORIDE 12.5 MG: 25 INJECTION, SOLUTION INTRAMUSCULAR; INTRAVENOUS; SUBCUTANEOUS at 19:24

## 2024-08-22 RX ADMIN — HYDROMORPHONE HYDROCHLORIDE 0.25 MG: 0.25 INJECTION, SOLUTION INTRAMUSCULAR; INTRAVENOUS; SUBCUTANEOUS at 21:51

## 2024-08-22 RX ADMIN — INSULIN ASPART 1 UNITS: 100 INJECTION, SOLUTION INTRAVENOUS; SUBCUTANEOUS at 06:19

## 2024-08-22 RX ADMIN — MEPERIDINE HYDROCHLORIDE 12.5 MG: 25 INJECTION, SOLUTION INTRAMUSCULAR; INTRAVENOUS; SUBCUTANEOUS at 19:30

## 2024-08-22 RX ADMIN — LISINOPRIL 20 MG: 20 TABLET ORAL at 08:47

## 2024-08-22 RX ADMIN — FENTANYL CITRATE 50 MCG: 0.05 INJECTION, SOLUTION INTRAMUSCULAR; INTRAVENOUS at 19:04

## 2024-08-22 RX ADMIN — CEFAZOLIN 10: 10 INJECTION, POWDER, FOR SOLUTION INTRAVENOUS at 23:21

## 2024-08-22 RX ADMIN — TRAMADOL HYDROCHLORIDE 50 MG: 50 TABLET, COATED ORAL at 08:54

## 2024-08-22 RX ADMIN — CEFAZOLIN: 10 INJECTION, POWDER, FOR SOLUTION INTRAVENOUS at 18:26

## 2024-08-22 RX ADMIN — TRAMADOL HYDROCHLORIDE 50 MG: 50 TABLET, COATED ORAL at 20:06

## 2024-08-22 NOTE — W.PN.UPDATE
"Update Note"~"Progress Note Update"~"-: "~"s/p right heel debridement and closure"~"-Strict NWB RLE"~"-Cultures and path taken post lavage, anticipate 6 weeks of IV ABx"~"-dressings C/D/I, float heel and elevate limb"~"-Okay to D/C per pods/ortho"~"-Follow up in office next week "

## 2024-08-22 NOTE — VATNOTE
PICC malpositioned per Radiology report. Patient taken to OR prior to VAT being able to redirect PICC line. Pre-Op/PACU RNs made aware not to use PICC line. VAT to reposition PICC line once able.

## 2024-08-22 NOTE — PTCARENOTE
"Received report from PACU - Angie KRUEGER. Patient s/p I&D of right heel. Assisted patient into bed without difficult, nursing assessment completed and as documented. Patient with ACE/splint wrapped to RLE, toes visible at this time; +sensation, +cap "~"refill, warm to touch and able to move on command. Patient c/o 9/10 pain in right heel, PRN medications given see MAR. REIE PICC repositioned per VAT, CXR ordered and resulted - ok to use. PO medications given without difficulty, boxed lunch "~"provided, VSS, call bell within reach, continue with current care."

## 2024-08-22 NOTE — VATNOTE
Pt. returned from PACU and thus able to redirect right PICC per protocol. Repeat CXR ordered. Wife at bedside.

## 2024-08-22 NOTE — PTCARENOTE
"8/22- OR called for report.  Advised PICC is not able to be used at this time d/t coiling in the R-subclavian as per Physician.  PICC will be adjusted by IV team post-procedure as per Physician.  OR verbalized understanding.  OR requests 1600 Ancef "~"be sent with him to OR to be administered pre-op.  This dose of Ancef will not be administered on this floor. "

## 2024-08-22 NOTE — W.PN.HOSP.TC
"Today's Communication/Plan"~"-"~"-: "~"OR today"~"PICC ordered"~"Assessment / Plan"~"Assessment / Plan"~"-: "~"Chronic nonhealing right heel ulcer with overlying soft tissue swelling concerning for soft tissue infection.  MRI raises concern for calcaneal osteomyelitis."~"Status post I&D of right heel wound as well as removal of anchors in the heel.  For repeat surgery today and possible closure then noted."~"Continue the current antibiotics and follow culture data."~"Patient might need long-term antibiotics-appreciate ID input- arranging OP tx for 6 weeks. PICC line after surgery ."~"DM 2 -hold metformin.  On [facility] as an outpatient.  Started on sliding scale insulin.  No neuropathy.  HbA1c 7.1"~"Hypertension-continue with ACE inhibitor's but hold hydrochlorothiazide for now."~"Good DP rt foot -hold US "~"Anticipated Discharge: 24 - 48 hours"~"Subjective/Interval History"~"-"~"-: "~"Date of Service:  August 22, 2024"~"No overnight events."~"No fever or chills."~"Right foot pain is okay."~"Objective Data"~"-"~"Vital Signs: "~"Vital Signs"~"Temp	Pulse	Resp	BP	Pulse Ox"~" 97.8 F 	 76 	 16 	 147/80 	 98 "~" 08/22/24 07:30	 08/22/24 07:30	 08/22/24 07:30	 08/22/24 07:30	 08/22/24 08:40"~"I&O"~"	08/21/24	08/22/24	08/23/24"~"	06:59	06:59	06:59"~"Intake Total	735 / 735	960 / 960	"~"Output Total	1075 / 1075		"~"Balance	-340 / -340	960 / 960	"~"Review of Systems"~"-"~"Respiratory: Denies Trouble Breathing"~"Cardiac: Denies Chest Pain"~"Abdomen/GI: Denies Abdominal Pain, Nausea, Vomiting or Diarrhea"~"Neuro: Denies Dizzy"~"Physical Exam"~"-"~"General: No Apparent Distress"~"HEENT: Moist Mucous Membranes"~"Respiratory: Clear to Auscultation"~"Cardiac: Regular Rhythm and S1/S2"~"Musculoskeletal: Other (right foot in dressing)"~"Neuro: AO x 3"~"Psych: Calm"

## 2024-08-22 NOTE — W.PN.ID1
"Date of Service"~"-: "~"Date of Service:  August 22, 2024"~"Today's Communication"~"-: "~"Continue antibiotics."~"Assessment / Plan"~"-: "~"Right ankle osteomyelitis"~"DM (uncontrolled (A1c = 7.1)"~"HTN"~"Dyslipidemia"~"Recommendations:"~"Continue cefazolin 2 g IV every 8 hours.  Given osteomyelitis, patient will require 6-week course of antibiotics."~"Moving forward, patient will require PICC line placement, which can be placed once home infusion benefits can be determined.  "~"Home infusion sheet placed on paper chart."~"Chief Complaint"~"-: Other (Right foot infection)"~"Subjective / Review of Systems"~"Review of Systems: No Fever and No Chills"~"Vital Signs / Physical Exam"~"Vital Signs"~"-: "~"Vital Signs"~"Temp	Pulse	Resp	BP	Pulse Ox"~" 97.8 F 	 76 	 16 	 147/80 	 98 "~" 08/22/24 07:30	 08/22/24 07:30	 08/22/24 07:30	 08/22/24 07:30	 08/22/24 08:40"~"Physical Exam"~"Constitutional: No Acute Distress, Comfortable and Non-toxic"~"Eyes: No Conjunctival Hemorrhage"~"Cardiovascular: S1/S2; Negative S3/S4"~"Pulmonary: Non Labored"~"Gastrointestinal: Soft, Non Tender and Non Distended"~"Wound: Other (Right foot dressed in Ace wrap.  No erythema extending up leg.)"~"Neurological: Awake and Alert"~"Objective Data"~"Lab Data"~"-: "~"Lab Results"~"08/20/24 07:09 "~"08/20/24 07:09 "~"Estimated Creat Clear	 111 ml/min 	08/20/24  07:09    "~"Total Bilirubin	 1.1 mg/dl (0.2-1.3)  	08/19/24  15:40    "~"AST	 46 U/L (17-59)  	08/19/24  15:40    "~"ALT	 58 U/L (0-50)  H 	08/19/24  15:40    "~"Alkaline Phosphatase	 59 U/L ()  	08/19/24  15:40    "~"Most recent labs reviewed."~"Micro Results: "~" 08/20/24 17:50	Wound Culture - Preliminary"~" Heel - Right	   No growth"~"	Gram Stain - Preliminary"~" 08/20/24 17:50	Tissue Culture - Final"~" Heel - Right	   S aureus-Methicillin Sensitive"~"	Gram Stain - Final"~" 08/19/24 16:41	Blood Culture - Preliminary"~" Blood/Venous	   No Growth in 48 hours- Final report to follow"~" 08/20/24 17:50	Anaerobic Culture - Preliminary"~" Heel - Right	   Culture pending.  Anaerobic cultures are examined after 3"~"	   days incubation.  Additional information to follow."~" 08/19/24 16:41	Wound Culture - Final"~" Foot - Right	   S aureus-Methicillin Sensitive"~"	Gram Stain - Final"~" 08/19/24 22:06	MRSA Screen - Final"~" Nose	   No Methicillin Resistant Staphylococcus aureus isolated."~"-: "~"Imaging:"~"8/20/2024 MRI right lower extremity:  There is a soft tissue defect with underlying phlegmonous change along the posterior soft tissues of the heel extending through the distal aspect of the Achilles tendon and into the underlying posterior "~"calcaneal bone with findings of calcaneal osteomyelitis. There is increased signal more pronounced along the superior calcaneal anchors, likely due to loosening/infection. "~"8/19/2024 Right foot x-ray: The posterior and superior aspects of the os calcis have been resected in the interval since the prior study and there is severe overlying soft tissue swelling."~"Osteomyelitis is not excluded on the basis of this imaging and if this is a clinical consideration, it could be best further evaluated with nonemergent MRI.  Please see full dictation for additional detail.  Film personally viewed."

## 2024-08-23 VITALS — SYSTOLIC BLOOD PRESSURE: 141 MMHG | RESPIRATION RATE: 18 BRPM | DIASTOLIC BLOOD PRESSURE: 85 MMHG

## 2024-08-23 VITALS — SYSTOLIC BLOOD PRESSURE: 144 MMHG | RESPIRATION RATE: 16 BRPM | DIASTOLIC BLOOD PRESSURE: 80 MMHG

## 2024-08-23 VITALS — RESPIRATION RATE: 18 BRPM | DIASTOLIC BLOOD PRESSURE: 82 MMHG | SYSTOLIC BLOOD PRESSURE: 128 MMHG

## 2024-08-23 VITALS — DIASTOLIC BLOOD PRESSURE: 74 MMHG | SYSTOLIC BLOOD PRESSURE: 143 MMHG | RESPIRATION RATE: 18 BRPM

## 2024-08-23 VITALS — RESPIRATION RATE: 18 BRPM | SYSTOLIC BLOOD PRESSURE: 127 MMHG | DIASTOLIC BLOOD PRESSURE: 70 MMHG

## 2024-08-23 LAB
GLUCOSE - POINT OF CARE: 140 MG/DL (ref 70–99)
GLUCOSE - POINT OF CARE: 153 MG/DL (ref 70–99)
GLUCOSE - POINT OF CARE: 174 MG/DL (ref 70–99)
GLUCOSE - POINT OF CARE: 194 MG/DL (ref 70–99)

## 2024-08-23 RX ADMIN — ACETAMINOPHEN 650 MG: 325 TABLET ORAL at 08:35

## 2024-08-23 RX ADMIN — GABAPENTIN 300 MG: 300 CAPSULE ORAL at 11:17

## 2024-08-23 RX ADMIN — CEFAZOLIN 10: 10 INJECTION, POWDER, FOR SOLUTION INTRAVENOUS at 23:50

## 2024-08-23 RX ADMIN — TRAMADOL HYDROCHLORIDE 50 MG: 50 TABLET, COATED ORAL at 12:45

## 2024-08-23 RX ADMIN — TRAMADOL HYDROCHLORIDE 50 MG: 50 TABLET, COATED ORAL at 03:18

## 2024-08-23 RX ADMIN — TRAMADOL HYDROCHLORIDE 50 MG: 50 TABLET, COATED ORAL at 19:54

## 2024-08-23 RX ADMIN — LISINOPRIL 20 MG: 20 TABLET ORAL at 08:35

## 2024-08-23 RX ADMIN — OXYCODONE HYDROCHLORIDE 5 MG: 5 TABLET ORAL at 08:49

## 2024-08-23 RX ADMIN — CEFAZOLIN 10: 10 INJECTION, POWDER, FOR SOLUTION INTRAVENOUS at 16:12

## 2024-08-23 RX ADMIN — ACETAMINOPHEN 650 MG: 325 TABLET ORAL at 03:18

## 2024-08-23 RX ADMIN — INSULIN ASPART 1 UNITS: 100 INJECTION, SOLUTION INTRAVENOUS; SUBCUTANEOUS at 12:52

## 2024-08-23 RX ADMIN — INSULIN ASPART: 100 INJECTION, SOLUTION INTRAVENOUS; SUBCUTANEOUS at 07:53

## 2024-08-23 RX ADMIN — INSULIN ASPART 1 UNITS: 100 INJECTION, SOLUTION INTRAVENOUS; SUBCUTANEOUS at 16:48

## 2024-08-23 RX ADMIN — OXYCODONE HYDROCHLORIDE 5 MG: 5 TABLET ORAL at 23:51

## 2024-08-23 RX ADMIN — GABAPENTIN 300 MG: 300 CAPSULE ORAL at 03:18

## 2024-08-23 RX ADMIN — OXYCODONE HYDROCHLORIDE 5 MG: 5 TABLET ORAL at 16:11

## 2024-08-23 RX ADMIN — CEFAZOLIN 10: 10 INJECTION, POWDER, FOR SOLUTION INTRAVENOUS at 08:35

## 2024-08-23 RX ADMIN — ATORVASTATIN CALCIUM 10 MG: 10 TABLET, FILM COATED ORAL at 08:35

## 2024-08-23 RX ADMIN — GABAPENTIN 300 MG: 300 CAPSULE ORAL at 19:55

## 2024-08-23 RX ADMIN — ACETAMINOPHEN 650 MG: 325 TABLET ORAL at 16:45

## 2024-08-23 NOTE — CM
"Addendum entered by Cindy Zhang  08/23/24 15:16: "~"Patient contacted  requesting a tier 1 infusion company, and provided name of facility to  [facility] [phone] Fax [phone], all clinical faxed and per [place] pharmacist at [facility] he will not "~"be able to provide medication till Monday or Tuesday.  Patient does not want to stay till Monday and asked that  appeal with the insurance for a tier 2 or 3,  reached out to patient's insurance [phone] and spoke with "~"Sidney, and requested to talk with supervisor to see if they could offer a tier 2 or tier 3 that could provide medication today for patient. "~"Addendum entered by Cindy Zhang  08/23/24 15:13: "~" continues to follow with patient progress, and toro contacted case tio stating that he wanted a tier one infusion compnay, [facility] are a tier 3, caqse manuelaamamgenr [id number] out to [facility] orf Lorene Higginbotham [phone] "~"Original Note:"~"Chart reviewed and per notes plan is for patient to return to home on IV ABX, options discussed for home infusion therapy and patient is agreeable to [facility], referral sent to [facility] for home infusion, and Annetta will be out today to teach "~"patient between 2:30-3pm, [facility] to check on cost, patient and physician aware of plan. Hopefully discharge home and have patient set up for 10pm delivery. "~"Plan; Home with [facility] infusion. "~"	``"

## 2024-08-23 NOTE — W.PN.HOSP.TC
"Today's Communication/Plan"~"-"~"-: "~"cleared for d/c by all consultants--has PICC for IV ABX"~"await CM"~"Assessment / Plan"~"Assessment / Plan"~"-: "~"pt is a 58 year old male"~"Chronic nonhealing right heel ulcer with overlying soft tissue swelling concerning for soft tissue infection.  MRI raises concern for calcaneal osteomyelitis--Status post I&D of right heel wound as well as removal of anchors in the heel.  For repeat "~"surgery today and possible closure then noted--appreciate ID input- arranging OP tx for 6 weeks. PICC line in place"~"DM 2 -hold metformin.  [facility].  Started on sliding scale insulin.  No neuropathy.  HbA1c 7.1"~"Essential Hypertension-continue with ACE inhibitor's but hold hydrochlorothiazide for now."~"Good DP rt foot -hold US "~"Anticipated Discharge: Today"~"Subjective/Interval History"~"-"~"-: "~"Date of Service:  August 23, 2024"~"pt ready for discharge"~"Objective Data"~"-"~"Vital Signs: "~"max temp for 24 hours"~"	08/22/24"~"22:10"~"Temp	99.4 F"~"Vital Signs"~"Temp	Pulse	Resp	BP	Pulse Ox"~" 97.7 F 	 81 	 16 	 144/80 	 96 "~" 08/23/24 11:14	 08/23/24 11:14	 08/23/24 11:14	 08/23/24 11:14	 08/23/24 11:14"~"I&O"~"	08/22/24	08/23/24	08/24/24"~"	06:59	06:59	06:59"~"Intake Total	960 / 960	340 / 340	"~"Output Total		800 / 800	"~"Balance	960 / 960	-460 / -460	"~"Review of Systems"~"-"~"All other systems: Reviewed and negative"~"Physical Exam"~"-"~"General: Well Developed, Well Nourished and No Apparent Distress"~"HEENT: Normocephalic and Atraumatic"~"Respiratory: Clear to Auscultation; Negative Wheezes or Rhonchi"~"Cardiac: Regular Rhythm and S1/S2; Negative Murmur"~"GI: Soft, Nontender, Nondistended and Normal Bowel Sounds"~"Musculoskeletal: No Clubbing, No Cyanosis and Other (right leg wrapped)"~"Neuro: Awake and Alert"

## 2024-08-23 NOTE — W.PN.ORTHO
"Today's Communication / Plan"~"-"~"-: "~"58M POD 1 s/p right heel debridement and closure w/ Dr. Adler"~"-Strict NWB RLE"~"-Cultures and path taken post lavage, anticipate 6 weeks of IV ABx"~"-dressings C/D/I, float heel and elevate limb"~"-Okay to D/C per pods/ortho"~"-Follow up in office next week "~"-DVT ppx 6 weeks ASA unless recommended otherwise per primary"~"-please include gabapentin, tramadol, and something in small quantity for breathrough pain ie. oxycodone IR "~"Assessment"~"."~"Distal Motor Intact: Yes"~"Dressing: "~"Clean, dry and intact."~"Plan"~"."~"Surgery / Date: 22 Aug 2024 w/ Dr. Adler"~"Activity: "~"Out of bed."~"PT/OT"~"Subjective"~"."~".: "~"Patient resting comfortably."~"Vital Signs and Labs"~"."~"Vital Signs and Labs: "~"Lab Results"~"08/20/24 07:09 "~"08/20/24 07:09 "~"Temp	Pulse	Resp	BP	Pulse Ox"~" 98.2 F 	 68 	 18 	 127/70 	 97 "~" 08/23/24 03:13	 08/23/24 03:13	 08/23/24 03:13	 08/23/24 03:13	 08/23/24 03:13"

## 2024-08-24 VITALS — RESPIRATION RATE: 20 BRPM | SYSTOLIC BLOOD PRESSURE: 130 MMHG | DIASTOLIC BLOOD PRESSURE: 80 MMHG

## 2024-08-24 VITALS — SYSTOLIC BLOOD PRESSURE: 147 MMHG | DIASTOLIC BLOOD PRESSURE: 89 MMHG | RESPIRATION RATE: 16 BRPM

## 2024-08-24 VITALS — RESPIRATION RATE: 20 BRPM | DIASTOLIC BLOOD PRESSURE: 72 MMHG | SYSTOLIC BLOOD PRESSURE: 134 MMHG

## 2024-08-24 LAB
BUN SERPL-MCNC: 14 MG/DL (ref 9–20)
CALCIUM SERPL-MCNC: 8.8 MG/DL (ref 8.4–10.2)
CHLORIDE SERPL-SCNC: 105 MMOL/L (ref 98–107)
CO2 SERPL-SCNC: 25 MMOL/L (ref 22–30)
EGFR: > 60
ERYTHROCYTE [DISTWIDTH] IN BLOOD BY AUTOMATED COUNT: 13.7 % (ref 11.5–14.5)
ESTIMATED CREATININE CLEARANCE: > 125 ML/MIN
GLUCOSE - POINT OF CARE: 146 MG/DL (ref 70–99)
GLUCOSE - POINT OF CARE: 150 MG/DL (ref 70–99)
GLUCOSE - POINT OF CARE: 155 MG/DL (ref 70–99)
GLUCOSE - POINT OF CARE: 169 MG/DL (ref 70–99)
GLUCOSE SERPL-MCNC: 212 MG/DL (ref 70–99)
HCT VFR BLD AUTO: 32.1 % (ref 39–52)
HGB BLD-MCNC: 11.1 G/DL (ref 13–18)
MCHC RBC AUTO-ENTMCNC: 34.6 G/DL (ref 33–37)
MCV RBC AUTO: 84 FL (ref 80–94)
NRBC BLD AUTO-RTO: 0 %
PLATELET # BLD AUTO: 225 10^3/UL (ref 130–400)
POTASSIUM SERPL-SCNC: 4.2 MMOL/L (ref 3.5–5.1)
SODIUM SERPL-SCNC: 142 MMOL/L (ref 135–145)

## 2024-08-24 RX ADMIN — GABAPENTIN 300 MG: 300 CAPSULE ORAL at 19:21

## 2024-08-24 RX ADMIN — ATORVASTATIN CALCIUM 10 MG: 10 TABLET, FILM COATED ORAL at 08:06

## 2024-08-24 RX ADMIN — ACETAMINOPHEN 650 MG: 325 TABLET ORAL at 08:03

## 2024-08-24 RX ADMIN — LISINOPRIL 20 MG: 20 TABLET ORAL at 08:03

## 2024-08-24 RX ADMIN — TRAMADOL HYDROCHLORIDE 50 MG: 50 TABLET, COATED ORAL at 15:50

## 2024-08-24 RX ADMIN — TRAMADOL HYDROCHLORIDE 50 MG: 50 TABLET, COATED ORAL at 23:05

## 2024-08-24 RX ADMIN — INSULIN ASPART: 100 INJECTION, SOLUTION INTRAVENOUS; SUBCUTANEOUS at 16:48

## 2024-08-24 RX ADMIN — CEFAZOLIN 10: 10 INJECTION, POWDER, FOR SOLUTION INTRAVENOUS at 08:06

## 2024-08-24 RX ADMIN — TRAMADOL HYDROCHLORIDE 50 MG: 50 TABLET, COATED ORAL at 08:02

## 2024-08-24 RX ADMIN — ACETAMINOPHEN 650 MG: 325 TABLET ORAL at 23:05

## 2024-08-24 RX ADMIN — ACETAMINOPHEN 650 MG: 325 TABLET ORAL at 15:50

## 2024-08-24 RX ADMIN — CEFAZOLIN 10: 10 INJECTION, POWDER, FOR SOLUTION INTRAVENOUS at 23:06

## 2024-08-24 RX ADMIN — CEFAZOLIN 10: 10 INJECTION, POWDER, FOR SOLUTION INTRAVENOUS at 16:50

## 2024-08-24 RX ADMIN — GABAPENTIN 300 MG: 300 CAPSULE ORAL at 11:44

## 2024-08-24 RX ADMIN — INSULIN ASPART 1 UNITS: 100 INJECTION, SOLUTION INTRAVENOUS; SUBCUTANEOUS at 12:34

## 2024-08-24 RX ADMIN — INSULIN ASPART 1 UNITS: 100 INJECTION, SOLUTION INTRAVENOUS; SUBCUTANEOUS at 08:07

## 2024-08-24 NOTE — W.PN.ORTHO
"Today's Communication / Plan"~"-"~"-: "~"58M POD 2 s/p right heel debridement and closure w/ Dr. Adler"~"- Strict NWB RLE"~"- Tissue culture from 8/20: (+) S aureus-Methicillin Sensitive. Preliminary wound culture from 8/22: No growth. Preliminary gram stain: Rare WBC, no organisms seen. "~"- Cultures and path taken post lavage, anticipate 6 weeks of IV Cefazolin 2g q8 hours given osteomyelitis per ID. Appreciate ID recommendations. PICC line placed. "~"- Dressings C/D/I, float heel and elevate limb."~"- Okay to D/C per pods/ortho."~"- Follow up in office next week. "~"- DVT ppx 6 weeks ASA unless recommended otherwise per primary"~"- Please include Gabapentin, Tramadol, and something in small quantity for breathrough pain ie. Oxycodone IR."~"Assessment"~"."~"Distal Motor Intact: Yes"~"Dressing: "~"Clean, dry and intact."~"Splint intact RLE."~"Assessment: "~"POD 2 Right Heel debridement and closure 8/22 with Dr. Adler "~"Plan"~"."~"Surgery / Date: 22 Aug 2024 w/ Dr. Adler"~"Activity: "~"Out of bed."~"PT/OT"~"Discharge Information: "~"Appreciate CM"~"Subjective"~"."~".: "~"Patient resting comfortably."~"Vital Signs and Labs"~"."~"Vital Signs and Labs: "~"Lab Results"~"08/24/24 08:54 "~"08/24/24 08:54 "~"Temp	Pulse	Resp	BP	Pulse Ox"~" 98.2 F 	 73 	 20 	 130/80 	 96 "~" 08/24/24 07:14	 08/24/24 08:03	 08/24/24 07:14	 08/24/24 08:03	 08/24/24 07:14"

## 2024-08-24 NOTE — W.PN.HOSP.TC
"Today's Communication/Plan"~"-"~"-: "~"cw abx"~"Ongoing dispo efforts"~"Assessment / Plan"~"Assessment / Plan"~"-: "~"pt is a 58 year old male"~"Chronic nonhealing right heel ulcer with overlying soft tissue swelling concerning for soft tissue infection.  MRI raises concern for calcaneal osteomyelitis--Status post I&D of right heel wound as well as removal of anchors in the heel.  For repeat "~"surgery today and possible closure then noted--appreciate ID input- arranging OP tx for 6 weeks. PICC line in place"~"DM 2 -hold metformin.  On Toujeo as an outpatient.  Started on sliding scale insulin.  No neuropathy.  HbA1c 7.1"~"Essential Hypertension-continue with ACE inhibitor's but hold hydrochlorothiazide for now."~"Good DP rt foot -hold US "~"Anticipated Discharge: 24 - 48 hours"~"Subjective/Interval History"~"-"~"-: "~"Date of Service:  August 24, 2024"~"No new issues.  Await disposition-Home antibiotic arrangements."~"Denies any fever or chills."~"Right foot pain controlled ok with meds."~"Objective Data"~"-"~"Labs: "~"Laboratory Results"~" 	08/24/24"~" 	08:54"~"WBC	 7.1"~"Hgb	 11.1 L"~"Hct	 32.1 L"~"Plt Count	 225  D"~"Sodium	 142"~"Potassium	 4.2"~"Chloride	 105"~"Carbon Dioxide	 25"~"BUN	 14"~"Creatinine	 0.7"~"Glucose	 212 H"~"Calcium	 8.8"~"Vital Signs: "~"Vital Signs"~"Temp	Pulse	Resp	BP	Pulse Ox"~" 98.2 F 	 73 	 20 	 130/80 	 96 "~" 08/24/24 07:14	 08/24/24 08:03	 08/24/24 07:14	 08/24/24 08:03	 08/24/24 07:14"~"I&O"~"	08/23/24	08/24/24	08/25/24"~"	06:59	06:59	06:59"~"Intake Total	340 / 340	1680 / 1680	"~"Output Total	800 / 800		"~"Balance	-460 / -460	1680 / 1680	"~"Review of Systems"~"-"~"Constitutional: Denies Fever"~"Respiratory: Denies Trouble Breathing"~"Cardiac: Denies Chest Pain"~"Neuro: Denies Dizzy"~"Physical Exam"~"-"~"General: Comfortable"~"Respiratory: Non Labored Respirations; Negative Accessory Resp Muscle Use"~"Musculoskeletal: Other (rt foot in dressing - no obvious bleeding on dressing.)"~"Neuro: AO x 3"~"Psych: Calm"~"Data Reviewed"~"-"~"Labs: Labs Reviewed by me"

## 2024-08-25 VITALS — DIASTOLIC BLOOD PRESSURE: 80 MMHG | SYSTOLIC BLOOD PRESSURE: 142 MMHG | RESPIRATION RATE: 18 BRPM

## 2024-08-25 VITALS — SYSTOLIC BLOOD PRESSURE: 140 MMHG | DIASTOLIC BLOOD PRESSURE: 87 MMHG | RESPIRATION RATE: 16 BRPM

## 2024-08-25 VITALS — DIASTOLIC BLOOD PRESSURE: 80 MMHG | RESPIRATION RATE: 16 BRPM | SYSTOLIC BLOOD PRESSURE: 142 MMHG

## 2024-08-25 LAB
GLUCOSE - POINT OF CARE: 130 MG/DL (ref 70–99)
GLUCOSE - POINT OF CARE: 162 MG/DL (ref 70–99)
GLUCOSE - POINT OF CARE: 184 MG/DL (ref 70–99)

## 2024-08-25 RX ADMIN — LISINOPRIL 20 MG: 20 TABLET ORAL at 07:56

## 2024-08-25 RX ADMIN — ATORVASTATIN CALCIUM 10 MG: 10 TABLET, FILM COATED ORAL at 07:57

## 2024-08-25 RX ADMIN — CEFAZOLIN 10: 10 INJECTION, POWDER, FOR SOLUTION INTRAVENOUS at 16:04

## 2024-08-25 RX ADMIN — CEFAZOLIN 10: 10 INJECTION, POWDER, FOR SOLUTION INTRAVENOUS at 23:00

## 2024-08-25 RX ADMIN — TRAMADOL HYDROCHLORIDE 50 MG: 50 TABLET, COATED ORAL at 22:18

## 2024-08-25 RX ADMIN — INSULIN ASPART: 100 INJECTION, SOLUTION INTRAVENOUS; SUBCUTANEOUS at 12:31

## 2024-08-25 RX ADMIN — DIPHENHYDRAMINE HYDROCHLORIDE 25 MG: 25 CAPSULE ORAL at 22:59

## 2024-08-25 RX ADMIN — METFORMIN HYDROCHLORIDE 1000 MG: 500 TABLET ORAL at 17:19

## 2024-08-25 RX ADMIN — ACETAMINOPHEN 650 MG: 325 TABLET ORAL at 17:23

## 2024-08-25 RX ADMIN — ACETAMINOPHEN 650 MG: 325 TABLET ORAL at 07:56

## 2024-08-25 RX ADMIN — INSULIN ASPART 1 UNITS: 100 INJECTION, SOLUTION INTRAVENOUS; SUBCUTANEOUS at 17:19

## 2024-08-25 RX ADMIN — METFORMIN HYDROCHLORIDE 500 MG: 500 TABLET ORAL at 12:03

## 2024-08-25 RX ADMIN — ACETAMINOPHEN 650 MG: 325 TABLET ORAL at 12:02

## 2024-08-25 RX ADMIN — CEFAZOLIN 10: 10 INJECTION, POWDER, FOR SOLUTION INTRAVENOUS at 07:57

## 2024-08-25 RX ADMIN — INSULIN ASPART 1 UNITS: 100 INJECTION, SOLUTION INTRAVENOUS; SUBCUTANEOUS at 07:56

## 2024-08-25 NOTE — W.PN.HOSP.TC
"Today's Communication/Plan"~"-"~"-: "~"DC in am once IV abx are set up"~"Assessment / Plan"~"Assessment / Plan"~"-: "~"pt is a 58 year old male"~"Chronic nonhealing right heel ulcer with overlying soft tissue swelling concerning for soft tissue infection.  MRI raises concern for calcaneal osteomyelitis--Status post I&D of right heel wound as well as removal of anchors in the heel.  For repeat "~"surgery today and possible closure then noted--appreciate ID input- arranging OP tx for 6 weeks. PICC line in place"~"DM 2 -restart metformin.  On Toujeo as an outpatient.  cw sliding scale insulin.  No neuropathy.  HbA1c 7.1"~"Essential Hypertension-continue with ACE inhibitor's but hold hydrochlorothiazide for now."~"Good DP rt foot -hold US "~"Anticipated Discharge: Within 24 hours"~"Subjective/Interval History"~"-"~"-: "~"Date of Service:  August 25, 2024"~"Patient is sensitive to bandages and patches on the skin.  You are advised Dexcom he wears a skin barrier prior to applying it.  His got local skin reaction from PICC line dressing."~"Objective Data"~"-"~"Vital Signs: "~"Vital Signs"~"Temp	Pulse	Resp	BP	Pulse Ox"~" 98 F 	 66 	 16 	 140/87 	 95 "~" 08/25/24 07:35	 08/25/24 07:35	 08/25/24 07:35	 08/25/24 07:35	 08/25/24 07:35"~"I&O"~"	08/24/24	08/25/24	08/26/24"~"	06:59	06:59	06:59"~"Intake Total	1680 / 1680	840 / 840	480 / 480"~"Balance	1680 / 1680	840 / 840	480 / 480"~"Review of Systems"~"-"~"Respiratory: Denies Trouble Breathing"~"Cardiac: Denies Chest Pain"~"Abdomen/GI: Denies Abdominal Pain, Nausea or Vomiting"~"Skin: Reports Other (slight redness of skin at the borders of the picc line dressing)"~"Physical Exam"~"-"~"General: Comfortable"~"Respiratory: Non Labored Respirations; Negative Accessory Resp Muscle Use"~"Neuro: AO x 3"~"Psych: Calm"

## 2024-08-26 VITALS — DIASTOLIC BLOOD PRESSURE: 82 MMHG | SYSTOLIC BLOOD PRESSURE: 156 MMHG | RESPIRATION RATE: 18 BRPM

## 2024-08-26 VITALS — DIASTOLIC BLOOD PRESSURE: 81 MMHG | RESPIRATION RATE: 20 BRPM | SYSTOLIC BLOOD PRESSURE: 142 MMHG

## 2024-08-26 VITALS — DIASTOLIC BLOOD PRESSURE: 87 MMHG | RESPIRATION RATE: 20 BRPM | SYSTOLIC BLOOD PRESSURE: 153 MMHG

## 2024-08-26 LAB
GLUCOSE - POINT OF CARE: 154 MG/DL (ref 70–99)
GLUCOSE - POINT OF CARE: 158 MG/DL (ref 70–99)
GLUCOSE - POINT OF CARE: 165 MG/DL (ref 70–99)
GLUCOSE - POINT OF CARE: 242 MG/DL (ref 70–99)
GLUCOSE - POINT OF CARE: 266 MG/DL (ref 70–99)

## 2024-08-26 RX ADMIN — LISINOPRIL 20 MG: 20 TABLET ORAL at 07:51

## 2024-08-26 RX ADMIN — INSULIN ASPART 2 UNITS: 100 INJECTION, SOLUTION INTRAVENOUS; SUBCUTANEOUS at 17:29

## 2024-08-26 RX ADMIN — CEFAZOLIN 10: 10 INJECTION, POWDER, FOR SOLUTION INTRAVENOUS at 07:50

## 2024-08-26 RX ADMIN — ATORVASTATIN CALCIUM 10 MG: 10 TABLET, FILM COATED ORAL at 07:49

## 2024-08-26 RX ADMIN — HYDROCORTISONE 1 APPLIC: 25 OINTMENT TOPICAL at 11:02

## 2024-08-26 RX ADMIN — HYDROCORTISONE 1 APPLIC: 25 OINTMENT TOPICAL at 19:26

## 2024-08-26 RX ADMIN — CEFAZOLIN 10: 10 INJECTION, POWDER, FOR SOLUTION INTRAVENOUS at 15:54

## 2024-08-26 RX ADMIN — CEFAZOLIN 10: 10 INJECTION, POWDER, FOR SOLUTION INTRAVENOUS at 23:02

## 2024-08-26 RX ADMIN — METFORMIN HYDROCHLORIDE 500 MG: 500 TABLET ORAL at 07:49

## 2024-08-26 RX ADMIN — PREDNISONE 20 MG: 20 TABLET ORAL at 10:13

## 2024-08-26 RX ADMIN — ACETAMINOPHEN 650 MG: 325 TABLET ORAL at 19:26

## 2024-08-26 RX ADMIN — INSULIN ASPART 1 UNITS: 100 INJECTION, SOLUTION INTRAVENOUS; SUBCUTANEOUS at 11:36

## 2024-08-26 RX ADMIN — METFORMIN HYDROCHLORIDE 1000 MG: 500 TABLET ORAL at 17:29

## 2024-08-26 RX ADMIN — INSULIN ASPART 1 UNITS: 100 INJECTION, SOLUTION INTRAVENOUS; SUBCUTANEOUS at 07:49

## 2024-08-26 NOTE — W.PN.HOSP.TC
"Today's Communication/Plan"~"-"~"-: "~"IV ABX"~"Assessment / Plan"~"Assessment / Plan"~"-: "~"pt is a 58 year old male"~"Chronic nonhealing right heel ulcer with overlying soft tissue swelling concerning for soft tissue infection.  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 PICC line in place"~"contact dermatitis -- topical hydrocortisone--add prednisone (short course)-- will not d/c on"~"DM 2 -restart metformin.  On Toujeo as an outpatient.  cw sliding scale insulin.  No neuropathy.  HbA1c 7.1"~"Essential Hypertension-continue with ACE inhibitor's but hold hydrochlorothiazide for now."~"Good DP rt foot -hold US "~"Anticipated Discharge: Within 24 hours"~"Subjective/Interval History"~"-"~"-: "~"Date of Service:  August 26, 2024"~"pt waiting  to go home with IV ABX"~"Objective Data"~"-"~"Vital Signs: "~"max temp for 24 hours"~"	08/25/24"~"15:35"~"Temp	98.4 F"~"Vital Signs"~"Temp	Pulse	Resp	BP	Pulse Ox"~" 98.1 F 	 75 	 20 	 133/75 	 97 "~" 08/26/24 07:45	 08/26/24 07:51	 08/26/24 07:45	 08/26/24 07:51	 08/26/24 07:45"~"I&O"~"	08/25/24	08/26/24	08/27/24"~"	06:59	06:59	06:59"~"Intake Total	840 / 840	480 / 480	"~"Balance	840 / 840	480 / 480	"~"Review of Systems"~"-"~"All other systems: Reviewed and negative"~"Skin: Reports Rash (right upper arm around PICC line--red itchy)"~"Physical Exam"~"-"~"General: Well Developed, Well Nourished and No Apparent Distress"~"HEENT: Normocephalic and Atraumatic"~"Respiratory: Clear to Auscultation; Negative Wheezes or Rhonchi"~"Cardiac: Regular Rhythm and S1/S2; Negative Murmur"~"GI: Soft, Nontender, Nondistended and Normal Bowel Sounds"~"Musculoskeletal: No Clubbing, No Cyanosis and No Edema"~"Skin: Rash (macular rash right upper arm--around PICC line site--looks like contact dermatitis)"

## 2024-08-26 NOTE — CM
" reviewed patient's chart and met with patient and spoke with [facility] and [facility] and [facility] has a copay, therefore the plan is to use [facility].  spoke "~"with Orlando  at [facility] and he is still running benefits through patient's insurance, All clinical paperwork has been faxed over to infusion company since Friday."~"Plan; Home with IV ABX from [facility] and [facility], referral sent to [facility]."

## 2024-08-26 NOTE — VNURNOTE
"Home Health Liaison spoke with patient to discuss [facility] nurse/therapy, visits, schedule and homebound status. Discussed that visits at home will be 1-3 x per week to assess and teach medical management and provide wound care as applicable.  Explained "~"that [facility] will contact them for start of care in 1-2 days after discharge from .  Patient does not believe that he will need wound care once he goes home until after he has office follow up with Podiatry.  He wants to speak to his wife and decide "~"about [facility] tomorrow.  "~"[facility] referral in [facility] Port.  Will follow up."

## 2024-08-27 VITALS — RESPIRATION RATE: 20 BRPM | SYSTOLIC BLOOD PRESSURE: 149 MMHG | DIASTOLIC BLOOD PRESSURE: 90 MMHG

## 2024-08-27 VITALS — SYSTOLIC BLOOD PRESSURE: 125 MMHG | DIASTOLIC BLOOD PRESSURE: 69 MMHG | RESPIRATION RATE: 20 BRPM

## 2024-08-27 LAB
GLUCOSE - POINT OF CARE: 168 MG/DL (ref 70–99)
GLUCOSE - POINT OF CARE: 246 MG/DL (ref 70–99)

## 2024-08-27 RX ADMIN — ACETAMINOPHEN 650 MG: 325 TABLET ORAL at 11:39

## 2024-08-27 RX ADMIN — HYDROCORTISONE 1 APPLIC: 25 OINTMENT TOPICAL at 08:16

## 2024-08-27 RX ADMIN — CEFAZOLIN 10: 10 INJECTION, POWDER, FOR SOLUTION INTRAVENOUS at 08:13

## 2024-08-27 RX ADMIN — INSULIN ASPART 2 UNITS: 100 INJECTION, SOLUTION INTRAVENOUS; SUBCUTANEOUS at 11:35

## 2024-08-27 RX ADMIN — ATORVASTATIN CALCIUM 10 MG: 10 TABLET, FILM COATED ORAL at 08:13

## 2024-08-27 RX ADMIN — INSULIN ASPART 1 UNITS: 100 INJECTION, SOLUTION INTRAVENOUS; SUBCUTANEOUS at 08:13

## 2024-08-27 RX ADMIN — METFORMIN HYDROCHLORIDE 500 MG: 500 TABLET ORAL at 08:15

## 2024-08-27 RX ADMIN — PREDNISONE 20 MG: 20 TABLET ORAL at 08:15

## 2024-08-27 RX ADMIN — LISINOPRIL 20 MG: 20 TABLET ORAL at 08:15

## 2024-08-27 NOTE — CM
"Addendum entered by Juliana Cheek  08/27/24 16:09: "~"[facility] unable to do home assessment and Patient made aware. CM reviewed with Dr. Adler and patient able to follow up with outpatient therapy as needed."~"Original Note:"~"CM spoke with Orlando at [facility];  [phone] Fax [phone]. Per Orlando [facility] does not have the ability to provide PT/OT. Patient teach will be with [facility] today and nursing will be there to provide medication at next dose time "~"of 6pm.  CM will reach out to [facility] once Orlando provides information to see if they can provide PT/TO.  CM will continue to follow for discharge planning needs."~"Plan; home with iv antibiotics."

## 2024-08-27 NOTE — W.PN.HOSP.TC
"Addendum entered and electronically signed by Brigida Mccann MD  08/28/24 09:44: "~"acute anemia expected with blood draws in hospital, IVF, etc"~"Original Note:"~"Today's Communication/Plan"~"-"~"-: "~"still waiting for infusion company to arrange ABX/teaching/VN, etc"~"Assessment / Plan"~"Assessment / Plan"~"-: "~"pt is a 58 year old male"~"Chronic nonhealing right heel ulcer with overlying soft tissue swelling concerning for soft tissue infection.  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 PICC line in place"~"contact dermatitis -- topical hydrocortisone--add prednisone (short course)-- will not d/c on"~"DM 2 -restart metformin.  [facility]  cw sliding scale insulin.  No neuropathy.  HbA1c 7.1"~"Essential Hypertension-continue with ACE inhibitor's but hold hydrochlorothiazide for now."~"Good DP rt foot -hold US "~"Anticipated Discharge: Today"~"Subjective/Interval History"~"-"~"-: "~"Date of Service:  August 27, 2024"~"pt waiting for d/c"~"Objective Data"~"-"~"Vital Signs: "~"max temp for 24 hours"~"	08/26/24"~"15:09"~"Temp	98.7 F"~"Vital Signs"~"Temp	Pulse	Resp	BP	Pulse Ox"~" 97.8 F 	 70 	 20 	 153/90 	 100 "~" 08/27/24 07:27	 08/27/24 08:15	 08/27/24 07:27	 08/27/24 08:15	 08/27/24 07:27"~"I&O"~"	08/26/24	08/27/24	08/28/24"~"	06:59	06:59	06:59"~"Intake Total	480 / 480	1940 / 1940	10 / 10"~"Balance	480 / 480	1940 / 1940	10 / 10"~"Review of Systems"~"-"~"All other systems: Reviewed and negative"~"Physical Exam"~"-"~"General: Well Developed, Well Nourished and No Apparent Distress"~"HEENT: Normocephalic and Atraumatic"~"Respiratory: Clear to Auscultation; Negative Wheezes or Rhonchi"~"Cardiac: Regular Rhythm and S1/S2; Negative Murmur"~"GI: Soft, Nontender, Nondistended and Normal Bowel Sounds"~"Musculoskeletal: No Clubbing, No Cyanosis and Other (post op stiff cast on right foot)"

## 2024-08-27 NOTE — PN.CDI
"CDI"~"- -"~"CDI: "~"Physician Documentation Request"~"Admit Date: 08/19/24 18:51"~"Dear Doctor Siobhan,"~"Patient admitted with chronic nonhealing right heel ulcer."~"8/27 PN, 'Status post I&D of right heel wound as well as removal of anchors in the heel.'"~"8/19 Hgb 14.0"~"8/24 Hgb 11.1"~"Based on the above, please provide in your note the likely diagnosis you are evaluating, monitoring and/or treating?"~"	Acute blood loss anemia"~"	Insignificant abnormal lab finding"~"	Other"~"Use of terms such as suspected, likely, concern for, or probable (associated with a specific diagnosis that is being evaluated, monitored, or treated as if it exists) are acceptable and can be coded in the inpatient setting, when documented at the "~"time of discharge. "~"Thank you, "~"Jocelynn HERNANDEZN,RN,CCDS"~"CDI Specialist"~"Available via tiger text"~"Please use your independent medical judgment in providing your response."

## 2024-08-27 NOTE — VNURNOTE
"Follow up note:  This author spoke to podiatrist, Dr Adler.  He is ordering home PT, OT.  No home nursing at this time.  Spoke to patient. Updated him on podiatrist's recs.  Patient is agreeable, he believes his insurance is capitated with "~"[facility], OT.  He will call insurance co. and find out. MAMIE Cheek updated.  [facility]. "